# Patient Record
Sex: FEMALE | Race: WHITE | URBAN - METROPOLITAN AREA
[De-identification: names, ages, dates, MRNs, and addresses within clinical notes are randomized per-mention and may not be internally consistent; named-entity substitution may affect disease eponyms.]

---
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
## 2020-09-21 ENCOUNTER — OFFICE VISIT (OUTPATIENT)
Dept: ORTHOPEDIC SURGERY | Age: 20
End: 2020-09-21
Payer: COMMERCIAL

## 2020-09-21 VITALS — HEIGHT: 69 IN | BODY MASS INDEX: 32.58 KG/M2 | WEIGHT: 220 LBS

## 2020-09-21 PROBLEM — M25.562 LEFT KNEE PAIN: Status: ACTIVE | Noted: 2020-09-21

## 2020-09-21 PROBLEM — S80.02XA CONTUSION OF LEFT KNEE: Status: ACTIVE | Noted: 2020-09-21

## 2020-09-21 PROCEDURE — G8417 CALC BMI ABV UP PARAM F/U: HCPCS | Performed by: PHYSICIAN ASSISTANT

## 2020-09-21 PROCEDURE — G8427 DOCREV CUR MEDS BY ELIG CLIN: HCPCS | Performed by: PHYSICIAN ASSISTANT

## 2020-09-21 PROCEDURE — 99213 OFFICE O/P EST LOW 20 MIN: CPT | Performed by: PHYSICIAN ASSISTANT

## 2020-09-21 PROCEDURE — 1036F TOBACCO NON-USER: CPT | Performed by: PHYSICIAN ASSISTANT

## 2020-09-21 RX ORDER — SUMATRIPTAN 25 MG/1
25 TABLET, FILM COATED ORAL
COMMUNITY

## 2020-09-21 NOTE — PROGRESS NOTES
Subjective:      Patient ID: Asia Vogt is a 23 y.o. female. Chief Complaint   Patient presents with    Knee Pain     Right knee. Alis Elliott at work. HPI:   She is here for an initial evaluation of a new problem. Right anterior knee pain. She fell yesterday on the anterior aspect of the right knee. Developed pain and swelling in her right knee. [facility]. Pain Scale 5/10 VAS. Location of pain anterior knee. Pain is worse with activity. Pain improves with rest.   Previous treatments have included ice with mild improvement. Review Of Systems:   A 14 point review of systems and history form completed by the patient has been reviewed. This scanned in the media tab of the patient's chart under today's date. As outlined in the HPI. Negative for fever or chills. Negative for joint pain, swelling and stiffness. Negative for numbness or tingling. Past Medical History:   Diagnosis Date    Migraine        Family History   Problem Relation Age of Onset    Mental Illness Maternal Cousin        History reviewed. No pertinent surgical history. Social History     Occupational History    Not on file   Tobacco Use    Smoking status: Never Smoker    Smokeless tobacco: Never Used   Substance and Sexual Activity    Alcohol use: No    Drug use: No    Sexual activity: Not Currently       Current Outpatient Medications   Medication Sig Dispense Refill    SUMAtriptan (IMITREX) 25 MG tablet Take 25 mg by mouth once as needed for Migraine       No current facility-administered medications for this visit. Objective:     She is alert, oriented x 3, pleasant, well nourished, developed and in no   acute distress. Ht 5' 9\" (1.753 m)   Wt 220 lb (99.8 kg)   BMI 32.49 kg/m²      Examination of the right knee: The alignment of the knee is neutral.   There is not erythema. There mild soft tissue swelling. There is no effusion.   ROM-  Extension 0          - treatment options also reviewed in detail. Rest, Ice, Compression and Elevation  OTC NSAID'S discussed to be taken in appropriate  therapeutic doses. Activity restriction/ Modification discussed. The patient was advised that NSAID-type medications have two very important potential side effects: gastrointestinal irritation including hemorrhage and renal injuries. She was asked to take the medication with food and to stop if she experiences any GI upset. I asked her to call for vomiting, abdominal pain or black/bloody stools. He should have renal function testing per his medical provider periodically. The patient expresses understanding of these issues and questions were answered. Follow Up: 2 weeks if no improvement. Call or return to clinic prn if these symptoms worsen or fail to improve as anticipated.

## 2021-10-15 ENCOUNTER — CLINICAL DOCUMENTATION (OUTPATIENT)
Dept: OTHER | Age: 21
End: 2021-10-15